# Patient Record
Sex: MALE | Race: WHITE | URBAN - METROPOLITAN AREA
[De-identification: names, ages, dates, MRNs, and addresses within clinical notes are randomized per-mention and may not be internally consistent; named-entity substitution may affect disease eponyms.]

---

## 2019-01-22 ENCOUNTER — APPOINTMENT (RX ONLY)
Dept: URBAN - METROPOLITAN AREA CLINIC 348 | Facility: CLINIC | Age: 39
Setting detail: DERMATOLOGY
End: 2019-01-22

## 2019-01-22 DIAGNOSIS — L28.0 LICHEN SIMPLEX CHRONICUS: ICD-10-CM

## 2019-01-22 DIAGNOSIS — L43.2 LICHENOID DRUG REACTION: ICD-10-CM

## 2019-01-22 DIAGNOSIS — D17 BENIGN LIPOMATOUS NEOPLASM: ICD-10-CM

## 2019-01-22 DIAGNOSIS — L82.1 OTHER SEBORRHEIC KERATOSIS: ICD-10-CM

## 2019-01-22 DIAGNOSIS — L30.0 NUMMULAR DERMATITIS: ICD-10-CM

## 2019-01-22 PROBLEM — D17.22 BENIGN LIPOMATOUS NEOPLASM OF SKIN AND SUBCUTANEOUS TISSUE OF LEFT ARM: Status: ACTIVE | Noted: 2019-01-22

## 2019-01-22 PROBLEM — D17.24 BENIGN LIPOMATOUS NEOPLASM OF SKIN AND SUBCUTANEOUS TISSUE OF LEFT LEG: Status: ACTIVE | Noted: 2019-01-22

## 2019-01-22 PROBLEM — D17.1 BENIGN LIPOMATOUS NEOPLASM OF SKIN AND SUBCUTANEOUS TISSUE OF TRUNK: Status: ACTIVE | Noted: 2019-01-22

## 2019-01-22 PROBLEM — D17.21 BENIGN LIPOMATOUS NEOPLASM OF SKIN AND SUBCUTANEOUS TISSUE OF RIGHT ARM: Status: ACTIVE | Noted: 2019-01-22

## 2019-01-22 PROBLEM — D17.23 BENIGN LIPOMATOUS NEOPLASM OF SKIN AND SUBCUTANEOUS TISSUE OF RIGHT LEG: Status: ACTIVE | Noted: 2019-01-22

## 2019-01-22 PROCEDURE — ? TREATMENT REGIMEN

## 2019-01-22 PROCEDURE — ? PRESCRIPTION

## 2019-01-22 PROCEDURE — 99202 OFFICE O/P NEW SF 15 MIN: CPT

## 2019-01-22 PROCEDURE — ? COUNSELING

## 2019-01-22 RX ORDER — BETAMETHASONE DIPROPIONATE 0.5 MG/G
CREAM TOPICAL
Qty: 1 | Refills: 2 | Status: ERX | COMMUNITY
Start: 2019-01-22

## 2019-01-22 RX ADMIN — BETAMETHASONE DIPROPIONATE: 0.5 CREAM TOPICAL at 00:00

## 2019-01-22 ASSESSMENT — LOCATION DETAILED DESCRIPTION DERM
LOCATION DETAILED: INFERIOR LUMBAR SPINE
LOCATION DETAILED: RIGHT ANTERIOR DISTAL UPPER ARM
LOCATION DETAILED: LEFT ANTERIOR DISTAL UPPER ARM
LOCATION DETAILED: RIGHT ANTERIOR PROXIMAL THIGH
LOCATION DETAILED: LEFT INFERIOR MEDIAL UPPER BACK
LOCATION DETAILED: INFERIOR THORACIC SPINE
LOCATION DETAILED: LEFT ANTERIOR PROXIMAL THIGH
LOCATION DETAILED: SUPERIOR MIDLINE SCROTUM

## 2019-01-22 ASSESSMENT — LOCATION SIMPLE DESCRIPTION DERM
LOCATION SIMPLE: RIGHT THIGH
LOCATION SIMPLE: LEFT UPPER ARM
LOCATION SIMPLE: LEFT UPPER BACK
LOCATION SIMPLE: UPPER BACK
LOCATION SIMPLE: LOWER BACK
LOCATION SIMPLE: RIGHT UPPER ARM
LOCATION SIMPLE: SUPERIOR SCROTUM
LOCATION SIMPLE: LEFT THIGH

## 2019-01-22 ASSESSMENT — LOCATION ZONE DERM
LOCATION ZONE: TRUNK
LOCATION ZONE: LEG
LOCATION ZONE: ARM
LOCATION ZONE: SCROTUM

## 2019-01-22 NOTE — PROCEDURE: TREATMENT REGIMEN
Continue Regimen: Dove unscented soap
Detail Level: Zone
Initiate Treatment: Betamethasone to affected area twice daily for two weeks

## 2019-01-22 NOTE — HPI: SKIN LESION
How Severe Is Your Skin Lesion?: mild
Has Your Skin Lesion Been Treated?: not been treated
Is This A New Presentation, Or A Follow-Up?: Growth
Which Family Member (Optional)?: Grandfather

## 2021-04-02 ENCOUNTER — APPOINTMENT (RX ONLY)
Dept: URBAN - METROPOLITAN AREA CLINIC 348 | Facility: CLINIC | Age: 41
Setting detail: DERMATOLOGY
End: 2021-04-02

## 2021-04-02 DIAGNOSIS — D17 BENIGN LIPOMATOUS NEOPLASM: ICD-10-CM

## 2021-04-02 DIAGNOSIS — L30.9 DERMATITIS, UNSPECIFIED: ICD-10-CM

## 2021-04-02 PROBLEM — D17.1 BENIGN LIPOMATOUS NEOPLASM OF SKIN AND SUBCUTANEOUS TISSUE OF TRUNK: Status: ACTIVE | Noted: 2021-04-02

## 2021-04-02 PROCEDURE — 99213 OFFICE O/P EST LOW 20 MIN: CPT | Mod: 25

## 2021-04-02 PROCEDURE — ? BIOPSY BY SHAVE METHOD

## 2021-04-02 PROCEDURE — 11102 TANGNTL BX SKIN SINGLE LES: CPT

## 2021-04-02 PROCEDURE — ? TREATMENT REGIMEN

## 2021-04-02 PROCEDURE — ? PRESCRIPTION

## 2021-04-02 PROCEDURE — ? COUNSELING

## 2021-04-02 RX ORDER — CLOBETASOL PROPIONATE 0.5 MG/G
CREAM TOPICAL
Qty: 1 | Refills: 3 | Status: ERX | COMMUNITY
Start: 2021-04-02

## 2021-04-02 RX ADMIN — CLOBETASOL PROPIONATE: 0.5 CREAM TOPICAL at 00:00

## 2021-04-02 ASSESSMENT — LOCATION ZONE DERM
LOCATION ZONE: LEG
LOCATION ZONE: TRUNK

## 2021-04-02 ASSESSMENT — LOCATION DETAILED DESCRIPTION DERM
LOCATION DETAILED: LEFT SUPERIOR UPPER BACK
LOCATION DETAILED: LEFT PROXIMAL PRETIBIAL REGION

## 2021-04-02 ASSESSMENT — LOCATION SIMPLE DESCRIPTION DERM
LOCATION SIMPLE: LEFT UPPER BACK
LOCATION SIMPLE: LEFT PRETIBIAL REGION

## 2021-04-02 NOTE — PROCEDURE: BIOPSY BY SHAVE METHOD
Anesthesia Type: 1% lidocaine with 1:100,000 epinephrine
Validate Triangulation: No
Additional Anesthesia Volume In Cc (Will Not Render If 0): 0
Wound Care: Aquaphor
Billing Type: Third-Party Bill
Curettage Text: The wound bed was treated with curettage after the biopsy was performed.
Detail Level: Detailed
Biopsy Type: H and E
Type Of Destruction Used: Curettage
Electrodesiccation And Curettage Text: The wound bed was treated with electrodesiccation and curettage after the biopsy was performed.
Post-Care Instructions: I reviewed with the patient in detail post-care instructions. Patient is to keep the biopsy site dry overnight, and then apply aquaphor  twice daily until healed. Patient may apply hydrogen peroxide soaks to remove any crusting.
Was A Bandage Applied: Yes
Depth Of Biopsy: dermis
Electrodesiccation Text: The wound bed was treated with electrodesiccation after the biopsy was performed.
Hemostasis: Aluminum Chloride
Consent: Verbal consent was obtained and risks were reviewed including but not limited to scarring, infection, bleeding, scabbing, incomplete removal, nerve damage and allergy to anesthesia.
Notification Instructions: Signed consent obtained for patient to return to office in 2-4 week for biopsy results. Patient understands results will not be given over the phone.
Silver Nitrate Text: The wound bed was treated with silver nitrate after the biopsy was performed.
Anesthesia Volume In Cc (Will Not Render If 0): 1
Information: Selecting Yes will display possible errors in your note based on the variables you have selected. This validation is only offered as a suggestion for you. PLEASE NOTE THAT THE VALIDATION TEXT WILL BE REMOVED WHEN YOU FINALIZE YOUR NOTE. IF YOU WANT TO FAX A PRELIMINARY NOTE YOU WILL NEED TO TOGGLE THIS TO 'NO' IF YOU DO NOT WANT IT IN YOUR FAXED NOTE.
Cryotherapy Text: The wound bed was treated with cryotherapy after the biopsy was performed.
Dressing: bandage

## 2021-04-02 NOTE — PROCEDURE: TREATMENT REGIMEN
Initiate Treatment: clobetasol 0.05 % topical cream- Apply to affected area of leg twice daily x 2 weeks then only as needed for flares
Detail Level: Zone

## 2021-04-16 ENCOUNTER — APPOINTMENT (RX ONLY)
Dept: URBAN - METROPOLITAN AREA CLINIC 348 | Facility: CLINIC | Age: 41
Setting detail: DERMATOLOGY
End: 2021-04-16

## 2021-04-16 DIAGNOSIS — L30.9 DERMATITIS, UNSPECIFIED: ICD-10-CM | Status: IMPROVED

## 2021-04-16 PROCEDURE — ? TREATMENT REGIMEN

## 2021-04-16 PROCEDURE — 99213 OFFICE O/P EST LOW 20 MIN: CPT

## 2021-04-16 PROCEDURE — ? COUNSELING

## 2021-04-16 ASSESSMENT — LOCATION DETAILED DESCRIPTION DERM: LOCATION DETAILED: LEFT PROXIMAL PRETIBIAL REGION

## 2021-04-16 ASSESSMENT — LOCATION ZONE DERM: LOCATION ZONE: LEG

## 2021-04-16 ASSESSMENT — LOCATION SIMPLE DESCRIPTION DERM: LOCATION SIMPLE: LEFT PRETIBIAL REGION

## 2024-04-10 ENCOUNTER — APPOINTMENT (RX ONLY)
Dept: URBAN - METROPOLITAN AREA CLINIC 341 | Facility: CLINIC | Age: 44
Setting detail: DERMATOLOGY
End: 2024-04-10

## 2024-04-10 DIAGNOSIS — D17 BENIGN LIPOMATOUS NEOPLASM: ICD-10-CM | Status: INADEQUATELY CONTROLLED

## 2024-04-10 DIAGNOSIS — L81.4 OTHER MELANIN HYPERPIGMENTATION: ICD-10-CM

## 2024-04-10 DIAGNOSIS — D18.0 HEMANGIOMA: ICD-10-CM

## 2024-04-10 DIAGNOSIS — D22 MELANOCYTIC NEVI: ICD-10-CM

## 2024-04-10 PROBLEM — D17.22 BENIGN LIPOMATOUS NEOPLASM OF SKIN AND SUBCUTANEOUS TISSUE OF LEFT ARM: Status: ACTIVE | Noted: 2024-04-10

## 2024-04-10 PROBLEM — D17.1 BENIGN LIPOMATOUS NEOPLASM OF SKIN AND SUBCUTANEOUS TISSUE OF TRUNK: Status: ACTIVE | Noted: 2024-04-10

## 2024-04-10 PROBLEM — D18.01 HEMANGIOMA OF SKIN AND SUBCUTANEOUS TISSUE: Status: ACTIVE | Noted: 2024-04-10

## 2024-04-10 PROBLEM — D22.5 MELANOCYTIC NEVI OF TRUNK: Status: ACTIVE | Noted: 2024-04-10

## 2024-04-10 PROBLEM — D17.21 BENIGN LIPOMATOUS NEOPLASM OF SKIN AND SUBCUTANEOUS TISSUE OF RIGHT ARM: Status: ACTIVE | Noted: 2024-04-10

## 2024-04-10 PROCEDURE — ? DEFER

## 2024-04-10 PROCEDURE — ? CONSULTATION FOR EXCISION

## 2024-04-10 PROCEDURE — ? COUNSELING

## 2024-04-10 PROCEDURE — 99213 OFFICE O/P EST LOW 20 MIN: CPT

## 2024-04-10 PROCEDURE — ? TREATMENT REGIMEN

## 2024-04-10 ASSESSMENT — LOCATION SIMPLE DESCRIPTION DERM
LOCATION SIMPLE: RIGHT UPPER ARM
LOCATION SIMPLE: RIGHT UPPER BACK
LOCATION SIMPLE: ABDOMEN
LOCATION SIMPLE: LEFT UPPER ARM

## 2024-04-10 ASSESSMENT — LOCATION DETAILED DESCRIPTION DERM
LOCATION DETAILED: RIGHT MEDIAL UPPER BACK
LOCATION DETAILED: RIGHT SUPERIOR MEDIAL UPPER BACK
LOCATION DETAILED: RIGHT INFERIOR MEDIAL UPPER BACK
LOCATION DETAILED: RIGHT SUPERIOR FLANK
LOCATION DETAILED: EPIGASTRIC SKIN
LOCATION DETAILED: RIGHT ANTERIOR DISTAL UPPER ARM
LOCATION DETAILED: LEFT ANTERIOR DISTAL UPPER ARM

## 2024-04-10 ASSESSMENT — LOCATION ZONE DERM
LOCATION ZONE: TRUNK
LOCATION ZONE: ARM

## 2024-04-10 NOTE — PROCEDURE: DEFER
Instructions (Optional): Lipoma EXCISION (w/ TM)
Procedure To Be Performed At Next Visit: Excision
Detail Level: Detailed
X Size Of Lesion In Cm (Optional): 0
Introduction Text (Please End With A Colon): The following procedure was deferred:

## 2024-04-24 ENCOUNTER — APPOINTMENT (RX ONLY)
Dept: URBAN - METROPOLITAN AREA CLINIC 341 | Facility: CLINIC | Age: 44
Setting detail: DERMATOLOGY
End: 2024-04-24

## 2024-04-24 DIAGNOSIS — D17 BENIGN LIPOMATOUS NEOPLASM: ICD-10-CM | Status: INADEQUATELY CONTROLLED

## 2024-04-24 PROBLEM — D17.1 BENIGN LIPOMATOUS NEOPLASM OF SKIN AND SUBCUTANEOUS TISSUE OF TRUNK: Status: ACTIVE | Noted: 2024-04-24

## 2024-04-24 PROCEDURE — 11406 EXC TR-EXT B9+MARG >4.0 CM: CPT

## 2024-04-24 PROCEDURE — ? EXCISION

## 2024-04-24 PROCEDURE — 12032 INTMD RPR S/A/T/EXT 2.6-7.5: CPT

## 2024-04-24 ASSESSMENT — LOCATION ZONE DERM: LOCATION ZONE: TRUNK

## 2024-04-24 ASSESSMENT — LOCATION SIMPLE DESCRIPTION DERM: LOCATION SIMPLE: ABDOMEN

## 2024-04-24 ASSESSMENT — LOCATION DETAILED DESCRIPTION DERM: LOCATION DETAILED: EPIGASTRIC SKIN

## 2024-04-24 NOTE — PROCEDURE: EXCISION

## 2024-05-08 ENCOUNTER — APPOINTMENT (RX ONLY)
Dept: URBAN - METROPOLITAN AREA CLINIC 341 | Facility: CLINIC | Age: 44
Setting detail: DERMATOLOGY
End: 2024-05-08

## 2024-05-08 DIAGNOSIS — Z48.02 ENCOUNTER FOR REMOVAL OF SUTURES: ICD-10-CM

## 2024-05-08 PROCEDURE — ? SUTURE REMOVAL (NO GLOBAL PERIOD)

## 2024-05-08 ASSESSMENT — LOCATION SIMPLE DESCRIPTION DERM: LOCATION SIMPLE: ABDOMEN

## 2024-05-08 ASSESSMENT — LOCATION ZONE DERM: LOCATION ZONE: TRUNK

## 2024-05-08 ASSESSMENT — LOCATION DETAILED DESCRIPTION DERM: LOCATION DETAILED: EPIGASTRIC SKIN

## 2024-05-15 ENCOUNTER — APPOINTMENT (RX ONLY)
Dept: URBAN - METROPOLITAN AREA CLINIC 341 | Facility: CLINIC | Age: 44
Setting detail: DERMATOLOGY
End: 2024-05-15

## 2024-05-15 DIAGNOSIS — Z48.817 ENCOUNTER FOR SURGICAL AFTERCARE FOLLOWING SURGERY ON THE SKIN AND SUBCUTANEOUS TISSUE: ICD-10-CM

## 2024-05-15 PROCEDURE — ? POST-OP WOUND EVALUATION

## 2024-05-15 ASSESSMENT — LOCATION DETAILED DESCRIPTION DERM: LOCATION DETAILED: EPIGASTRIC SKIN

## 2024-05-15 ASSESSMENT — LOCATION SIMPLE DESCRIPTION DERM: LOCATION SIMPLE: ABDOMEN

## 2024-05-15 ASSESSMENT — LOCATION ZONE DERM: LOCATION ZONE: TRUNK
